# Patient Record
Sex: MALE | Race: WHITE | Employment: OTHER | URBAN - METROPOLITAN AREA
[De-identification: names, ages, dates, MRNs, and addresses within clinical notes are randomized per-mention and may not be internally consistent; named-entity substitution may affect disease eponyms.]

---

## 2017-12-08 ENCOUNTER — HOSPITAL ENCOUNTER (OUTPATIENT)
Dept: MRI IMAGING | Age: 72
Discharge: HOME | End: 2017-12-08
Attending: ORTHOPAEDIC SURGERY
Payer: MEDICARE

## 2017-12-08 DIAGNOSIS — M51.36 DEGENERATION OF LUMBAR INTERVERTEBRAL DISC: ICD-10-CM

## 2017-12-08 DIAGNOSIS — M54.40 LOW BACK PAIN WITH SCIATICA: ICD-10-CM

## 2017-12-08 DIAGNOSIS — M54.16 LEFT LUMBAR RADICULITIS: ICD-10-CM

## 2017-12-08 DIAGNOSIS — M43.16 SPONDYLOLISTHESIS OF LUMBAR REGION: ICD-10-CM

## 2017-12-08 PROCEDURE — 72148 MRI LUMBAR SPINE W/O DYE: CPT

## 2018-04-13 ENCOUNTER — HOSPITAL ENCOUNTER (OUTPATIENT)
Dept: MRI IMAGING | Age: 73
Discharge: HOME | End: 2018-04-13
Attending: ORTHOPAEDIC SURGERY
Payer: MEDICARE

## 2018-04-13 DIAGNOSIS — M54.50 LUMBAGO: ICD-10-CM

## 2018-04-13 DIAGNOSIS — M43.16 SPONDYLOLISTHESIS OF LUMBAR REGION: ICD-10-CM

## 2018-04-13 DIAGNOSIS — M51.26 LUMBAR DISC HERNIATION: ICD-10-CM

## 2018-04-13 DIAGNOSIS — M54.16 LUMBAR RADICULOPATHY: ICD-10-CM

## 2018-04-13 DIAGNOSIS — M48.061 SPINAL STENOSIS, LUMBAR REGION, WITHOUT NEUROGENIC CLAUDICATION: ICD-10-CM

## 2018-04-13 PROCEDURE — 72148 MRI LUMBAR SPINE W/O DYE: CPT

## 2018-04-20 ENCOUNTER — HOSPITAL ENCOUNTER (OUTPATIENT)
Dept: PREADMISSION TESTING | Age: 73
Discharge: HOME | End: 2018-04-20
Payer: MEDICARE

## 2018-04-20 VITALS
WEIGHT: 263.5 LBS | HEIGHT: 68 IN | TEMPERATURE: 97.8 F | SYSTOLIC BLOOD PRESSURE: 107 MMHG | RESPIRATION RATE: 20 BRPM | OXYGEN SATURATION: 95 % | DIASTOLIC BLOOD PRESSURE: 59 MMHG | BODY MASS INDEX: 39.93 KG/M2 | HEART RATE: 65 BPM

## 2018-04-20 LAB
ALBUMIN SERPL-MCNC: 3.7 G/DL (ref 3.5–5)
ALBUMIN/GLOB SERPL: 0.9 {RATIO} (ref 1.1–2.2)
ALP SERPL-CCNC: 76 U/L (ref 45–117)
ALT SERPL-CCNC: 37 U/L (ref 12–78)
ANION GAP SERPL CALC-SCNC: 11 MMOL/L (ref 5–15)
APTT PPP: 27.9 SEC (ref 22.1–32)
AST SERPL-CCNC: 28 U/L (ref 15–37)
BASOPHILS # BLD: 0 K/UL (ref 0–0.1)
BASOPHILS NFR BLD: 1 % (ref 0–1)
BILIRUB SERPL-MCNC: 0.6 MG/DL (ref 0.2–1)
BUN SERPL-MCNC: 84 MG/DL (ref 6–20)
BUN/CREAT SERPL: 15 (ref 12–20)
CALCIUM SERPL-MCNC: 9 MG/DL (ref 8.5–10.1)
CHLORIDE SERPL-SCNC: 97 MMOL/L (ref 97–108)
CO2 SERPL-SCNC: 25 MMOL/L (ref 21–32)
CREAT SERPL-MCNC: 5.73 MG/DL (ref 0.7–1.3)
DIFFERENTIAL METHOD BLD: ABNORMAL
EOSINOPHIL # BLD: 0.2 K/UL (ref 0–0.4)
EOSINOPHIL NFR BLD: 4 % (ref 0–7)
ERYTHROCYTE [DISTWIDTH] IN BLOOD BY AUTOMATED COUNT: 15.6 % (ref 11.5–14.5)
EST. AVERAGE GLUCOSE BLD GHB EST-MCNC: 160 MG/DL
GLOBULIN SER CALC-MCNC: 4 G/DL (ref 2–4)
GLUCOSE SERPL-MCNC: 207 MG/DL (ref 65–100)
HBA1C MFR BLD: 7.2 % (ref 4.2–6.3)
HCT VFR BLD AUTO: 34.6 % (ref 36.6–50.3)
HGB BLD-MCNC: 10.4 G/DL (ref 12.1–17)
IMM GRANULOCYTES # BLD: 0 K/UL (ref 0–0.04)
IMM GRANULOCYTES NFR BLD AUTO: 1 % (ref 0–0.5)
INR PPP: 1.1 (ref 0.9–1.1)
LYMPHOCYTES # BLD: 0.9 K/UL (ref 0.8–3.5)
LYMPHOCYTES NFR BLD: 14 % (ref 12–49)
MCH RBC QN AUTO: 29.8 PG (ref 26–34)
MCHC RBC AUTO-ENTMCNC: 30.1 G/DL (ref 30–36.5)
MCV RBC AUTO: 99.1 FL (ref 80–99)
MONOCYTES # BLD: 0.5 K/UL (ref 0–1)
MONOCYTES NFR BLD: 7 % (ref 5–13)
NEUTS SEG # BLD: 4.6 K/UL (ref 1.8–8)
NEUTS SEG NFR BLD: 74 % (ref 32–75)
NRBC # BLD: 0 K/UL (ref 0–0.01)
NRBC BLD-RTO: 0 PER 100 WBC
PLATELET # BLD AUTO: 137 K/UL (ref 150–400)
PMV BLD AUTO: 12.2 FL (ref 8.9–12.9)
POTASSIUM SERPL-SCNC: 6 MMOL/L (ref 3.5–5.1)
PROT SERPL-MCNC: 7.7 G/DL (ref 6.4–8.2)
PROTHROMBIN TIME: 11.6 SEC (ref 9–11.1)
RBC # BLD AUTO: 3.49 M/UL (ref 4.1–5.7)
SODIUM SERPL-SCNC: 133 MMOL/L (ref 136–145)
THERAPEUTIC RANGE,PTTT: NORMAL SECS (ref 58–77)
WBC # BLD AUTO: 6.3 K/UL (ref 4.1–11.1)

## 2018-04-20 PROCEDURE — 85025 COMPLETE CBC W/AUTO DIFF WBC: CPT | Performed by: ORTHOPAEDIC SURGERY

## 2018-04-20 PROCEDURE — 85730 THROMBOPLASTIN TIME PARTIAL: CPT | Performed by: ORTHOPAEDIC SURGERY

## 2018-04-20 PROCEDURE — 93005 ELECTROCARDIOGRAM TRACING: CPT

## 2018-04-20 PROCEDURE — 80053 COMPREHEN METABOLIC PANEL: CPT | Performed by: ORTHOPAEDIC SURGERY

## 2018-04-20 PROCEDURE — 85610 PROTHROMBIN TIME: CPT | Performed by: ORTHOPAEDIC SURGERY

## 2018-04-20 PROCEDURE — 36415 COLL VENOUS BLD VENIPUNCTURE: CPT | Performed by: ORTHOPAEDIC SURGERY

## 2018-04-20 PROCEDURE — 83036 HEMOGLOBIN GLYCOSYLATED A1C: CPT | Performed by: ORTHOPAEDIC SURGERY

## 2018-04-20 PROCEDURE — 82306 VITAMIN D 25 HYDROXY: CPT | Performed by: ORTHOPAEDIC SURGERY

## 2018-04-20 PROCEDURE — 86900 BLOOD TYPING SEROLOGIC ABO: CPT | Performed by: ORTHOPAEDIC SURGERY

## 2018-04-20 RX ORDER — BUMETANIDE 2 MG/1
1 TABLET ORAL DAILY
COMMUNITY

## 2018-04-20 RX ORDER — LISINOPRIL 20 MG/1
20 TABLET ORAL DAILY
COMMUNITY
End: 2021-04-06

## 2018-04-20 RX ORDER — ASPIRIN 81 MG/1
81 TABLET ORAL DAILY
COMMUNITY
End: 2021-04-06

## 2018-04-20 RX ORDER — GABAPENTIN 300 MG/1
300 CAPSULE ORAL 3 TIMES DAILY
COMMUNITY
End: 2021-04-06

## 2018-04-20 RX ORDER — CARVEDILOL 12.5 MG/1
TABLET ORAL 2 TIMES DAILY WITH MEALS
COMMUNITY

## 2018-04-20 NOTE — PERIOP NOTES
Discussed case with Dr. Garry Calles (anesthesia)- recent hospitalization for diastolic heart failure and progressive renal insufficiency. Patient should take Bumex the morning of surgery per anesthesia- notified wife Warren Hendrix) of these instructions.

## 2018-04-20 NOTE — H&P
PAT Pre-Op History & Physical    Patient: Lyndall Brunner                  MRN: [id number]          SSN: [id number]  YOB: 1945          Age: 67 y.o. Sex: male                Subjective:   Patient is a 67 y.o.  male who presents with history of chronic lower back pain. He began having back pain 11/2017 but it resolved after ZANDRA. In March his lower back pain has escalated over time. Rates his lower back pain as high as 10/10 and describes it as a constant aching pain. States that his pain radiates from his lower back into his left hip and down his left leg to below his knee. States when he stands up he has a sensation of \"bugs crawling up my left leg\". Walking and standing exacerbate his pain, lying down helps lessen his pain. His activity level has been greatly impacted due to his back pain and he is very inactive. Lumbar MRI done 04/13/2018 showed:    1. There is extruded disc herniation on the left arising at L2-3 and extending inferiorly along L5 the posterior margin of the left of midline. The extruded fragment is slightly larger than on the previous examination there is compression of the thecal sac. 2. There is moderate central spinal stenosis at L3-4 which is worse than on the previous exam.  3. There is severe central spinal stenosis at L4-5 which is worse than on the previous exam.     Has failed NSAIDs, narcotic pain medication, and  Gabapentin. The patient was evaluated in the surgeon's office and it was determined that the most appropriate plan of care is to proceed with surgical intervention. Patient's PCP Meli Torres MD      Patient's wife Austin Nelson helps provide history. She states that the patient was hospitalized at [facility] 04/02/2018 - 17/34/7149 with diastolic heart failure and progressive renal insufficiency.  She says he developed worsening dyspnea and had a rapid weight gain that sent him to the ED and he was then transferred to [address] [place] [address]. He was diuresed of 30 pounds of fluid and was seen by cardiology and nephrology while hospitalized. Has not had any chest pain or shortness of breath at rest since he was released from hospital.     Patient has NIDDM and has been off his Metformin 1000 mg 2x/day since he was admitted to the hospital. While at [address] [place] [address] his wife states he was on sliding scale insulin. His PCP was reluctant to start any new therapy until renal issues are clarified so patient is only on Glipizide 10 mg/day at this time. His wife states that his FSBS have been running higher than usual since he was dsichargeg from the hospital and only on Glipizide. Past Medical History:   Diagnosis Date    Arthritis     Cancer ([facility]     pre-cancerous skin    Chronic kidney disease     CKD stage 3- nephrologist Dr. Reynaldo Mendez    Chronic pain     Diabetes ([facility]     NIDDM    Diverticulosis     GERD (gastroesophageal reflux disease)     Heart failure ([facility] 83/77/9174    diastolic heart failure- hospitalized 4/02/2018- 4/08/2018 at [address] [place] [address]    History of colonic polyps 2006    Hypercholesterolemia     Hypertension     Ill-defined condition     burn to back from tractor    Inguinal hernia     Right.  Kidney stones 2003    Morbid obesity ([facility]     Other ill-defined conditions(799.89)     CHRONIC CYST LOWER BACK    Sleep apnea     Told he has sleep apnea when hospitalized-Needs to have sleep study done      Past Surgical History:   Procedure Laterality Date    ABDOMEN SURGERY PROC UNLISTED  11/2017    hernia surgery midline    HX HEMORRHOIDECTOMY  6/13/13    HX ORTHOPAEDIC      Right carpal tunnel release. Prior to Admission medications    Medication Sig Start Date End Date Taking? Authorizing Provider   lisinopril (PRINIVIL, ZESTRIL) 20 mg tablet Take 20 mg by mouth daily. Yes Historical Provider   aspirin delayed-release 81 mg tablet Take 81 mg by mouth daily.  Has not started as yet   Yes Historical Provider   gabapentin (NEURONTIN) 300 mg capsule Take 300 mg by mouth three (3) times daily. Takes 2 in am one in middle of day and 2 at evening   Yes Historical Provider   FOLIC ACID/MULTIVIT-MIN/LUTEIN (CENTRUM SILVER PO) Take  by mouth daily. Has not started yet, will begin after his surgery   Yes Historical Provider   carvedilol (COREG) 12.5 mg tablet Take  by mouth two (2) times daily (with meals). Yes Historical Provider   bumetanide (BUMEX) 2 mg tablet Take 2 mg by mouth daily. Yes Historical Provider   oxyCODONE IR (ROXICODONE) 5 mg immediate release tablet Take 1-2 Tabs by mouth every four (4) hours as needed for Pain. 6/27/13  Yes Eri Harmon MD   omeprazole (PRILOSEC) 20 mg capsule Take 20 mg by mouth daily. Yes Historical Provider   glipiZIDE (GLUCOTROL) 10 mg tablet Take 10 mg by mouth daily. XL    Yes Historical Provider   simvastatin (ZOCOR) 40 mg tablet Take 40 mg by mouth daily. Yes Historical Provider   vitamin e 400 unit tab Take  by mouth. Historical Provider     Current Outpatient Prescriptions   Medication Sig    lisinopril (PRINIVIL, ZESTRIL) 20 mg tablet Take 20 mg by mouth daily.  aspirin delayed-release 81 mg tablet Take 81 mg by mouth daily. Has not started as yet    gabapentin (NEURONTIN) 300 mg capsule Take 300 mg by mouth three (3) times daily. Takes 2 in am one in middle of day and 2 at evening    FOLIC ACID/MULTIVIT-MIN/LUTEIN (CENTRUM SILVER PO) Take  by mouth daily. Has not started yet, will begin after his surgery    carvedilol (COREG) 12.5 mg tablet Take  by mouth two (2) times daily (with meals).  bumetanide (BUMEX) 2 mg tablet Take 2 mg by mouth daily.  oxyCODONE IR (ROXICODONE) 5 mg immediate release tablet Take 1-2 Tabs by mouth every four (4) hours as needed for Pain.  omeprazole (PRILOSEC) 20 mg capsule Take 20 mg by mouth daily.  glipiZIDE (GLUCOTROL) 10 mg tablet Take 10 mg by mouth daily.  XL     simvastatin (ZOCOR) 40 mg tablet Take 40 mg by mouth daily.      vitamin e 400 unit tab Take  by mouth. No current facility-administered medications for this encounter. No Known Allergies   Social History   Substance Use Topics    Smoking status: Former Smoker     Packs/day: 2.00     Years: 40.00     Quit date: 1995    Smokeless tobacco: Current User      Comment: quit smoking cigarettes 25 yrs ago, CHEWS TOB.  Alcohol use No      History   Drug Use No     Family History   Problem Relation Age of Onset    Heart Attack Mother     Other Mother      encephalitis    Heart Disease Mother      MI    Cancer Father      lung    Diabetes Sister     COPD Sister     Dementia Sister     Arthritis-osteo Sister     Parkinson's Disease Brother     Arthritis-osteo Sister          Review of Systems    Patient denies difficulty swallowing, mouth sores, or loose teeth. Patient denies any recent dental procedures or any planned prior to surgery. Patient denies chest pain, tightness, pain radiating down left arm, palpitations. Denies dizziness, visual disturbances, or lightheadedness. Patient denies shortness of breath at rest, wheezing, cough, fever, or chills. Patient denies diarrhea, constipation, or abdominal pain. Patient denies urinary problems including dysuria, hesitancy, urgency, or incontinence. Denies skin breakdown, rashes, insect bites or open area. C/o lower back pain. Objective:   Patient Vitals for the past 24 hrs:   Temp Pulse Resp BP SpO2   18 1348 97.8 °F (36.6 °C) 65 20 107/59 95 %     Temp (24hrs), Av.8 °F (36.6 °C), Min:97.8 °F (36.6 °C), Max:97.8 °F (36.6 °C)    Body mass index is 40.07 kg/(m^2). Wt Readings from Last 1 Encounters:   18 119.5 kg (263 lb 8 oz)        Physical Exam:     General: Pleasant,  cooperative, no apparent distress, appears stated age. Eyes: Conjunctivae/corneas clear. EOMs intact.    Nose: Nares normal.   Mouth/Throat: Lips, mucosa, and tongue normal. Upper and lower dentures in place.   Neck: Supple, symmetrical, trachea midline. Back: Symmetric   Lungs: Clear to auscultation bilaterally. Heart: Regular rate and rhythm. Heart sounds distant-S1, S2 normal. No murmur, click, rub or gallop. Abdomen: Semi-soft,rotund, non-tender. Bowel sounds normal. No distention. Musculoskeletal:  Gait is antalgic. Uses cane to ambulate. Extremities:  Extremities normal, atraumatic, no cyanosis or edema. Calves                                 supple, non tender to palpation. Pulses: 2+ and symmetric bilateral upper extremities. Cap. refill <2 seconds   Skin: Skin color, texture, turgor normal.  No rashes or lesions. Neurologic: CN II-XII grossly intact. Alert and oriented x3. Labs:   Recent Results (from the past 72 hour(s))   CBC WITH AUTOMATED DIFF    Collection Time: 04/20/18  2:59 PM   Result Value Ref Range    WBC 6.3 4.1 - 11.1 K/uL    RBC 3.49 (L) 4.10 - 5.70 M/uL    HGB 10.4 (L) 12.1 - 17.0 g/dL    HCT 34.6 (L) 36.6 - 50.3 %    MCV 99.1 (H) 80.0 - 99.0 FL    MCH 29.8 26.0 - 34.0 PG    MCHC 30.1 30.0 - 36.5 g/dL    RDW 15.6 (H) 11.5 - 14.5 %    PLATELET 822 (L) 154 - 400 K/uL    MPV 12.2 8.9 - 12.9 FL    NRBC 0.0 0  WBC    ABSOLUTE NRBC 0.00 0.00 - 0.01 K/uL    NEUTROPHILS 74 32 - 75 %    LYMPHOCYTES 14 12 - 49 %    MONOCYTES 7 5 - 13 %    EOSINOPHILS 4 0 - 7 %    BASOPHILS 1 0 - 1 %    IMMATURE GRANULOCYTES 1 (H) 0.0 - 0.5 %    ABS. NEUTROPHILS 4.6 1.8 - 8.0 K/UL    ABS. LYMPHOCYTES 0.9 0.8 - 3.5 K/UL    ABS. MONOCYTES 0.5 0.0 - 1.0 K/UL    ABS. EOSINOPHILS 0.2 0.0 - 0.4 K/UL    ABS. BASOPHILS 0.0 0.0 - 0.1 K/UL    ABS. IMM.  GRANS. 0.0 0.00 - 0.04 K/UL    DF AUTOMATED     METABOLIC PANEL, COMPREHENSIVE    Collection Time: 04/20/18  2:59 PM   Result Value Ref Range    Sodium 133 (L) 136 - 145 mmol/L    Potassium 6.0 (H) 3.5 - 5.1 mmol/L    Chloride 97 97 - 108 mmol/L    CO2 25 21 - 32 mmol/L    Anion gap 11 5 - 15 mmol/L    Glucose 207 (H) 65 - 100 mg/dL    BUN 84 (H) 6 - 20 MG/DL    Creatinine 5.73 (H) 0.70 - 1.30 MG/DL    BUN/Creatinine ratio 15 12 - 20      GFR est AA 12 (L) >60 ml/min/1.73m2    GFR est non-AA 10 (L) >60 ml/min/1.73m2    Calcium 9.0 8.5 - 10.1 MG/DL    Bilirubin, total 0.6 0.2 - 1.0 MG/DL    ALT (SGPT) 37 12 - 78 U/L    AST (SGOT) 28 15 - 37 U/L    Alk. phosphatase 76 45 - 117 U/L    Protein, total 7.7 6.4 - 8.2 g/dL    Albumin 3.7 3.5 - 5.0 g/dL    Globulin 4.0 2.0 - 4.0 g/dL    A-G Ratio 0.9 (L) 1.1 - 2.2     HEMOGLOBIN A1C WITH EAG    Collection Time: 04/20/18  2:59 PM   Result Value Ref Range    Hemoglobin A1c 7.2 (H) 4.2 - 6.3 %    Est. average glucose 160 mg/dL   CULTURE, MRSA    Collection Time: 04/20/18  2:59 PM   Result Value Ref Range    Special Requests: NO SPECIAL REQUESTS      Culture result: MRSA NOT PRESENT      Culture result:            Screening of patient nares for MRSA is for surveillance purposes and, if positive, to facilitate isolation considerations in high risk settings. It is not intended for automatic decolonization interventions per se as regimens are not sufficiently effective to warrant routine use.    PROTHROMBIN TIME + INR    Collection Time: 04/20/18  2:59 PM   Result Value Ref Range    INR 1.1 0.9 - 1.1      Prothrombin time 11.6 (H) 9.0 - 11.1 sec   PTT    Collection Time: 04/20/18  2:59 PM   Result Value Ref Range    aPTT 27.9 22.1 - 32.0 sec    aPTT, therapeutic range     58.0 - 77.0 SECS   VITAMIN D, 25 HYDROXY    Collection Time: 04/20/18  2:59 PM   Result Value Ref Range    Vitamin D 25-Hydroxy 36.8 30 - 100 ng/mL   TYPE & SCREEN    Collection Time: 04/20/18  2:59 PM   Result Value Ref Range    Crossmatch Expiration 04/27/2018     ABO/Rh(D) A POSITIVE     Antibody screen NEG    EKG, 12 LEAD, INITIAL    Collection Time: 04/20/18  4:07 PM   Result Value Ref Range    Ventricular Rate 59 BPM    Atrial Rate 59 BPM    P-R Interval 222 ms    QRS Duration 94 ms    Q-T Interval 422 ms    QTC Calculation (Bezet) 417 ms    Calculated P Axis 61 degrees    Calculated R Axis -22 degrees    Calculated T Axis 31 degrees    Diagnosis       Sinus bradycardia with 1st degree AV block  Otherwise normal ECG  When compared with ECG of 21-JUN-2013 12:52,  UT interval has increased  Confirmed by Ariana Navarro MD, [address] ([id number]) on 4/21/2018 6:23:41 PM         Assessment:     Spinal stenosis of lumbar region with radiculopathy  Spondylolisthesis of lumbar region  Left lumbar radiculitis  Herniation of lumbar intervertebral disc  Disc degeneration, lumbar. Diabetes control less than optimal due to medication changes/ renal issues. Plan:     Scheduled for L2- L3 laminectomy and discectomy. Labs and EKG done per surgeon's orders. EKG unremarkable exept for 1st degree AV block. Lab results reviewed and multiple abnormal results on chemistry noted- creatinine =5.73/GFR=10, potassium= 6.0. Much worse that results from PCP on 4/11/2018 (creatinine= 2.07, GFR=31, potassium=4.0). CMP and CBC results faxed to PCP (Dr. Marko Choi) and nephrology (Dr. Shirley Mckeon). H/H slightly low ( 10.4/34.6) and platelets slightly decreased (137). Also PT slightly elevated  (11.6) but INR and PTT normal. Abnormal labs sent to surgeon's office via EMR. Copy of cardiac clearance from [address] and copy of renal clearance from Dr. Shirley Mckeon reviewed and both notes placed on paper chart. Labs obtained at PAT visit indicate that renal status has deteriorated since PCP labs- must be evaluated prior to surgery per anesthesia. Diabetes management may need to be adjusted after surgery to optimize healing. Patient unable to void at PAT on 4/20 - wife to bring urine sample to PAT 4/23.         Liset Espinoza, WILLIE

## 2018-04-21 LAB
25(OH)D3 SERPL-MCNC: 36.8 NG/ML (ref 30–100)
ABO + RH BLD: NORMAL
ATRIAL RATE: 59 BPM
BLOOD GROUP ANTIBODIES SERPL: NORMAL
CALCULATED P AXIS, ECG09: 61 DEGREES
CALCULATED R AXIS, ECG10: -22 DEGREES
CALCULATED T AXIS, ECG11: 31 DEGREES
DIAGNOSIS, 93000: NORMAL
P-R INTERVAL, ECG05: 222 MS
Q-T INTERVAL, ECG07: 422 MS
QRS DURATION, ECG06: 94 MS
QTC CALCULATION (BEZET), ECG08: 417 MS
SPECIMEN EXP DATE BLD: NORMAL
VENTRICULAR RATE, ECG03: 59 BPM

## 2018-04-22 LAB
BACTERIA SPEC CULT: NORMAL
BACTERIA SPEC CULT: NORMAL
SERVICE CMNT-IMP: NORMAL

## 2018-04-23 NOTE — PROGRESS NOTES
Renal function much worse than labs done 4/11 by PCP. Faxed lab results to PCP and nephrology office. Called and left message with Dr. Tonio Ta assistant that he needs to see labs ASAP. Discussed case with anesthesia (Dr. Shanti Montgomery) and he said surgery has to be postponed until renal situation addressed. Labs sent to Dr. Jackie Dixon via EMR, LM for office  re: abnormal labs. Also sent text re: abnormal labs and anesthesia says surgery must be postponed. Called and spoke with Sreekanth Jacobson in Dr Kirk Wiggins office ( nephrology)- she states they received copy of PAT labs. Asked that Dr. Chacon Swathi see labs ASAP. Spoke with patient's wife- Lisa Howe. Informed her that labs indicate that Mr. Traylor's kidney function has worsened since labs last drawn by PCP. Notified her that surgery cannot proceed per anesthesia until changes in labs addressed and patient needs to be seen by nephrology or PCP today. Patient called back later to let me know that they were in ED at [facility] per Dr. Kirk Wiggins instructions. Dr. Silvino Boss notified .

## 2020-01-07 ENCOUNTER — HOSPITAL ENCOUNTER (OUTPATIENT)
Dept: LAB | Age: 75
Discharge: HOME | End: 2020-01-07

## 2021-03-23 ENCOUNTER — TRANSCRIBE ORDER (OUTPATIENT)
Dept: SCHEDULING | Age: 76
End: 2021-03-23

## 2021-03-23 DIAGNOSIS — M19.072 ARTHRITIS OF LEFT ANKLE: Primary | ICD-10-CM

## 2021-03-30 ENCOUNTER — HOSPITAL ENCOUNTER (OUTPATIENT)
Dept: MRI IMAGING | Age: 76
Discharge: HOME | End: 2021-03-30
Attending: ORTHOPAEDIC SURGERY
Payer: MEDICARE

## 2021-03-30 DIAGNOSIS — M19.072 ARTHRITIS OF LEFT ANKLE: ICD-10-CM

## 2021-03-30 PROCEDURE — 73721 MRI JNT OF LWR EXTRE W/O DYE: CPT

## 2021-04-01 ENCOUNTER — TRANSCRIBE ORDER (OUTPATIENT)
Dept: REGISTRATION | Age: 76
End: 2021-04-01

## 2021-04-01 DIAGNOSIS — Z01.812 PRE-PROCEDURE LAB EXAM: Primary | ICD-10-CM

## 2021-04-03 ENCOUNTER — HOSPITAL ENCOUNTER (OUTPATIENT)
Dept: PREADMISSION TESTING | Age: 76
Discharge: HOME | End: 2021-04-03
Payer: MEDICARE

## 2021-04-03 DIAGNOSIS — Z01.812 PRE-PROCEDURE LAB EXAM: ICD-10-CM

## 2021-04-03 PROCEDURE — U0003 INFECTIOUS AGENT DETECTION BY NUCLEIC ACID (DNA OR RNA); SEVERE ACUTE RESPIRATORY SYNDROME CORONAVIRUS 2 (SARS-COV-2) (CORONAVIRUS DISEASE [COVID-19]), AMPLIFIED PROBE TECHNIQUE, MAKING USE OF HIGH THROUGHPUT TECHNOLOGIES AS DESCRIBED BY CMS-2020-01-R: HCPCS

## 2021-04-04 LAB — SARS-COV-2, COV2NT: NOT DETECTED

## 2021-04-06 ENCOUNTER — ANESTHESIA EVENT (OUTPATIENT)
Dept: SURGERY | Age: 76
End: 2021-04-06
Payer: MEDICARE

## 2021-04-06 RX ORDER — TRAZODONE HYDROCHLORIDE 50 MG/1
50 TABLET ORAL
COMMUNITY

## 2021-04-06 RX ORDER — METFORMIN HYDROCHLORIDE 1000 MG/1
1000 TABLET ORAL 2 TIMES DAILY WITH MEALS
COMMUNITY

## 2021-04-06 RX ORDER — CLONIDINE HYDROCHLORIDE 0.2 MG/1
0.2 TABLET ORAL 2 TIMES DAILY
COMMUNITY

## 2021-04-06 RX ORDER — CHOLECALCIFEROL (VITAMIN D3) 125 MCG
10 CAPSULE ORAL
COMMUNITY

## 2021-04-06 RX ORDER — AMLODIPINE BESYLATE 5 MG/1
5 TABLET ORAL DAILY
COMMUNITY

## 2021-04-06 RX ORDER — ACETAMINOPHEN 325 MG/1
650 TABLET ORAL 2 TIMES DAILY
COMMUNITY

## 2021-04-06 NOTE — PERIOP NOTES
ELOY GUSMAN COMPLETED, SPOKE WITH PATIENT AND HIS WIFE TODAY. PATIENT SEES DR. Mckenzie Damian (CARDIO) [phone], CALLED FOR LAST OFFICE VISIT NOTE AND EKG. PATIENT AND HIS WIFE GIVEN INSTRUCTIONS REGARDING USING ANTIBACTERIAL SOAP AND BATHING AT HOME WITH THAT THE NIGHT BEFORE SURGERY AND MORNING OF AS THEY SAID IT WOULD BE DIFFICULT FOR THEM TO GET CHG SOAP AT THIS TIME. PATIENT AND HIS WIFE MENTIONED CYST TO HIS WAISTLINE THAT BROKE OPEN, ADVISED THEM TO CALL AND LET DR. WESTON KASPER'S OFFICE KNOW OF OPEN SKIN AT THIS TIME WITH SURGERY TOMORROW 4/7/21 . PATIENT SEES DR. ESCUDERO (NEPHRO) FOR CKD3- CALLED FOR LAST OFFICE NOTE.  [phone]

## 2021-04-07 ENCOUNTER — HOSPITAL ENCOUNTER (OUTPATIENT)
Age: 76
Setting detail: OUTPATIENT SURGERY
Discharge: HOME | End: 2021-04-07
Attending: ORTHOPAEDIC SURGERY | Admitting: ORTHOPAEDIC SURGERY
Payer: MEDICARE

## 2021-04-07 ENCOUNTER — ANESTHESIA (OUTPATIENT)
Dept: SURGERY | Age: 76
End: 2021-04-07
Payer: MEDICARE

## 2021-04-07 VITALS
OXYGEN SATURATION: 96 % | RESPIRATION RATE: 16 BRPM | TEMPERATURE: 98.1 F | HEART RATE: 50 BPM | HEIGHT: 68 IN | BODY MASS INDEX: 40.32 KG/M2 | DIASTOLIC BLOOD PRESSURE: 83 MMHG | SYSTOLIC BLOOD PRESSURE: 150 MMHG | WEIGHT: 266 LBS

## 2021-04-07 DIAGNOSIS — M67.472 GANGLION OF LEFT ANKLE: Primary | ICD-10-CM

## 2021-04-07 LAB
EST. AVERAGE GLUCOSE BLD GHB EST-MCNC: 183 MG/DL
GLUCOSE BLD STRIP.AUTO-MCNC: 154 MG/DL (ref 65–100)
GLUCOSE BLD STRIP.AUTO-MCNC: 189 MG/DL (ref 65–100)
HBA1C MFR BLD: 8 % (ref 4–5.6)
SERVICE CMNT-IMP: ABNORMAL
SERVICE CMNT-IMP: ABNORMAL

## 2021-04-07 PROCEDURE — 77030002933 HC SUT MCRYL J&J -A: Performed by: ORTHOPAEDIC SURGERY

## 2021-04-07 PROCEDURE — 74011250636 HC RX REV CODE- 250/636: Performed by: ANESTHESIOLOGY

## 2021-04-07 PROCEDURE — 88304 TISSUE EXAM BY PATHOLOGIST: CPT

## 2021-04-07 PROCEDURE — 77030002916 HC SUT ETHLN J&J -A: Performed by: ORTHOPAEDIC SURGERY

## 2021-04-07 PROCEDURE — 36415 COLL VENOUS BLD VENIPUNCTURE: CPT

## 2021-04-07 PROCEDURE — 2709999900 HC NON-CHARGEABLE SUPPLY: Performed by: ORTHOPAEDIC SURGERY

## 2021-04-07 PROCEDURE — 74011250637 HC RX REV CODE- 250/637: Performed by: ANESTHESIOLOGY

## 2021-04-07 PROCEDURE — 76210000020 HC REC RM PH II FIRST 0.5 HR: Performed by: ORTHOPAEDIC SURGERY

## 2021-04-07 PROCEDURE — 76060000033 HC ANESTHESIA 1 TO 1.5 HR: Performed by: ORTHOPAEDIC SURGERY

## 2021-04-07 PROCEDURE — 76210000006 HC OR PH I REC 0.5 TO 1 HR: Performed by: ORTHOPAEDIC SURGERY

## 2021-04-07 PROCEDURE — 76010000138 HC OR TIME 0.5 TO 1 HR: Performed by: ORTHOPAEDIC SURGERY

## 2021-04-07 PROCEDURE — 83036 HEMOGLOBIN GLYCOSYLATED A1C: CPT

## 2021-04-07 PROCEDURE — 74011250636 HC RX REV CODE- 250/636: Performed by: ORTHOPAEDIC SURGERY

## 2021-04-07 PROCEDURE — 74011250636 HC RX REV CODE- 250/636: Performed by: NURSE ANESTHETIST, CERTIFIED REGISTERED

## 2021-04-07 PROCEDURE — 82962 GLUCOSE BLOOD TEST: CPT

## 2021-04-07 PROCEDURE — 88311 DECALCIFY TISSUE: CPT

## 2021-04-07 PROCEDURE — 74011000250 HC RX REV CODE- 250: Performed by: NURSE ANESTHETIST, CERTIFIED REGISTERED

## 2021-04-07 RX ORDER — MIDAZOLAM HYDROCHLORIDE 1 MG/ML
1 INJECTION, SOLUTION INTRAMUSCULAR; INTRAVENOUS AS NEEDED
Status: DISCONTINUED | OUTPATIENT
Start: 2021-04-07 | End: 2021-04-07 | Stop reason: HOSPADM

## 2021-04-07 RX ORDER — PROPOFOL 10 MG/ML
INJECTION, EMULSION INTRAVENOUS
Status: DISCONTINUED | OUTPATIENT
Start: 2021-04-07 | End: 2021-04-07 | Stop reason: HOSPADM

## 2021-04-07 RX ORDER — SODIUM CHLORIDE 0.9 % (FLUSH) 0.9 %
5-40 SYRINGE (ML) INJECTION EVERY 8 HOURS
Status: DISCONTINUED | OUTPATIENT
Start: 2021-04-07 | End: 2021-04-07 | Stop reason: HOSPADM

## 2021-04-07 RX ORDER — HYDROMORPHONE HYDROCHLORIDE 1 MG/ML
0.2 INJECTION, SOLUTION INTRAMUSCULAR; INTRAVENOUS; SUBCUTANEOUS
Status: DISCONTINUED | OUTPATIENT
Start: 2021-04-07 | End: 2021-04-07 | Stop reason: HOSPADM

## 2021-04-07 RX ORDER — SODIUM CHLORIDE 0.9 % (FLUSH) 0.9 %
5-40 SYRINGE (ML) INJECTION AS NEEDED
Status: DISCONTINUED | OUTPATIENT
Start: 2021-04-07 | End: 2021-04-07 | Stop reason: HOSPADM

## 2021-04-07 RX ORDER — SODIUM CHLORIDE 9 MG/ML
50 INJECTION, SOLUTION INTRAVENOUS CONTINUOUS
Status: DISCONTINUED | OUTPATIENT
Start: 2021-04-07 | End: 2021-04-07 | Stop reason: HOSPADM

## 2021-04-07 RX ORDER — OXYCODONE AND ACETAMINOPHEN 5; 325 MG/1; MG/1
1 TABLET ORAL AS NEEDED
Status: DISCONTINUED | OUTPATIENT
Start: 2021-04-07 | End: 2021-04-07 | Stop reason: HOSPADM

## 2021-04-07 RX ORDER — EPHEDRINE SULFATE/0.9% NACL/PF 50 MG/5 ML
5 SYRINGE (ML) INTRAVENOUS AS NEEDED
Status: DISCONTINUED | OUTPATIENT
Start: 2021-04-07 | End: 2021-04-07 | Stop reason: HOSPADM

## 2021-04-07 RX ORDER — SODIUM CHLORIDE 9 MG/ML
1000 INJECTION, SOLUTION INTRAVENOUS CONTINUOUS
Status: DISCONTINUED | OUTPATIENT
Start: 2021-04-07 | End: 2021-04-07 | Stop reason: HOSPADM

## 2021-04-07 RX ORDER — ACETAMINOPHEN 325 MG/1
650 TABLET ORAL ONCE
Status: COMPLETED | OUTPATIENT
Start: 2021-04-07 | End: 2021-04-07

## 2021-04-07 RX ORDER — FENTANYL CITRATE 50 UG/ML
50 INJECTION, SOLUTION INTRAMUSCULAR; INTRAVENOUS AS NEEDED
Status: DISCONTINUED | OUTPATIENT
Start: 2021-04-07 | End: 2021-04-07 | Stop reason: HOSPADM

## 2021-04-07 RX ORDER — CEPHALEXIN 500 MG/1
500 CAPSULE ORAL 4 TIMES DAILY
Qty: 4 CAP | Refills: 0 | Status: SHIPPED | OUTPATIENT
Start: 2021-04-07 | End: 2021-04-08

## 2021-04-07 RX ORDER — SODIUM CHLORIDE, SODIUM LACTATE, POTASSIUM CHLORIDE, CALCIUM CHLORIDE 600; 310; 30; 20 MG/100ML; MG/100ML; MG/100ML; MG/100ML
125 INJECTION, SOLUTION INTRAVENOUS CONTINUOUS
Status: DISCONTINUED | OUTPATIENT
Start: 2021-04-07 | End: 2021-04-07 | Stop reason: HOSPADM

## 2021-04-07 RX ORDER — ONDANSETRON 2 MG/ML
4 INJECTION INTRAMUSCULAR; INTRAVENOUS AS NEEDED
Status: DISCONTINUED | OUTPATIENT
Start: 2021-04-07 | End: 2021-04-07 | Stop reason: HOSPADM

## 2021-04-07 RX ORDER — DIPHENHYDRAMINE HYDROCHLORIDE 50 MG/ML
12.5 INJECTION, SOLUTION INTRAMUSCULAR; INTRAVENOUS AS NEEDED
Status: DISCONTINUED | OUTPATIENT
Start: 2021-04-07 | End: 2021-04-07 | Stop reason: HOSPADM

## 2021-04-07 RX ORDER — DEXMEDETOMIDINE HYDROCHLORIDE 100 UG/ML
INJECTION, SOLUTION INTRAVENOUS AS NEEDED
Status: DISCONTINUED | OUTPATIENT
Start: 2021-04-07 | End: 2021-04-07 | Stop reason: HOSPADM

## 2021-04-07 RX ORDER — ASPIRIN 325 MG
325 TABLET ORAL DAILY
Qty: 90 TAB | Refills: 0 | Status: SHIPPED | OUTPATIENT
Start: 2021-04-07

## 2021-04-07 RX ORDER — SODIUM CHLORIDE, SODIUM LACTATE, POTASSIUM CHLORIDE, CALCIUM CHLORIDE 600; 310; 30; 20 MG/100ML; MG/100ML; MG/100ML; MG/100ML
INJECTION, SOLUTION INTRAVENOUS
Status: DISCONTINUED | OUTPATIENT
Start: 2021-04-07 | End: 2021-04-07 | Stop reason: HOSPADM

## 2021-04-07 RX ORDER — MORPHINE SULFATE 2 MG/ML
2 INJECTION, SOLUTION INTRAMUSCULAR; INTRAVENOUS
Status: DISCONTINUED | OUTPATIENT
Start: 2021-04-07 | End: 2021-04-07 | Stop reason: HOSPADM

## 2021-04-07 RX ORDER — FENTANYL CITRATE 50 UG/ML
25 INJECTION, SOLUTION INTRAMUSCULAR; INTRAVENOUS
Status: DISCONTINUED | OUTPATIENT
Start: 2021-04-07 | End: 2021-04-07 | Stop reason: HOSPADM

## 2021-04-07 RX ORDER — MIDAZOLAM HYDROCHLORIDE 1 MG/ML
0.5 INJECTION, SOLUTION INTRAMUSCULAR; INTRAVENOUS
Status: DISCONTINUED | OUTPATIENT
Start: 2021-04-07 | End: 2021-04-07 | Stop reason: HOSPADM

## 2021-04-07 RX ORDER — PHENYLEPHRINE HCL IN 0.9% NACL 0.4MG/10ML
SYRINGE (ML) INTRAVENOUS AS NEEDED
Status: DISCONTINUED | OUTPATIENT
Start: 2021-04-07 | End: 2021-04-07 | Stop reason: HOSPADM

## 2021-04-07 RX ORDER — LIDOCAINE HYDROCHLORIDE 20 MG/ML
INJECTION, SOLUTION EPIDURAL; INFILTRATION; INTRACAUDAL; PERINEURAL AS NEEDED
Status: DISCONTINUED | OUTPATIENT
Start: 2021-04-07 | End: 2021-04-07 | Stop reason: HOSPADM

## 2021-04-07 RX ORDER — LIDOCAINE HYDROCHLORIDE 10 MG/ML
0.1 INJECTION, SOLUTION EPIDURAL; INFILTRATION; INTRACAUDAL; PERINEURAL AS NEEDED
Status: DISCONTINUED | OUTPATIENT
Start: 2021-04-07 | End: 2021-04-07 | Stop reason: HOSPADM

## 2021-04-07 RX ORDER — HYDROCODONE BITARTRATE AND ACETAMINOPHEN 5; 325 MG/1; MG/1
1 TABLET ORAL
Qty: 14 TAB | Refills: 0 | Status: SHIPPED | OUTPATIENT
Start: 2021-04-07 | End: 2021-04-10

## 2021-04-07 RX ADMIN — DEXMEDETOMIDINE HYDROCHLORIDE 4 MCG: 100 INJECTION, SOLUTION, CONCENTRATE INTRAVENOUS at 13:09

## 2021-04-07 RX ADMIN — Medication 40 MCG: at 13:44

## 2021-04-07 RX ADMIN — LIDOCAINE HYDROCHLORIDE 30 MG: 20 INJECTION, SOLUTION EPIDURAL; INFILTRATION; INTRACAUDAL; PERINEURAL at 13:05

## 2021-04-07 RX ADMIN — DEXMEDETOMIDINE HYDROCHLORIDE 4 MCG: 100 INJECTION, SOLUTION, CONCENTRATE INTRAVENOUS at 13:15

## 2021-04-07 RX ADMIN — MIDAZOLAM HYDROCHLORIDE 3 MG: 1 INJECTION, SOLUTION INTRAMUSCULAR; INTRAVENOUS at 12:17

## 2021-04-07 RX ADMIN — DEXMEDETOMIDINE HYDROCHLORIDE 4 MCG: 100 INJECTION, SOLUTION, CONCENTRATE INTRAVENOUS at 13:18

## 2021-04-07 RX ADMIN — DEXMEDETOMIDINE HYDROCHLORIDE 4 MCG: 100 INJECTION, SOLUTION, CONCENTRATE INTRAVENOUS at 13:11

## 2021-04-07 RX ADMIN — DEXMEDETOMIDINE HYDROCHLORIDE 4 MCG: 100 INJECTION, SOLUTION, CONCENTRATE INTRAVENOUS at 13:13

## 2021-04-07 RX ADMIN — PROPOFOL 75 MCG/KG/MIN: 10 INJECTION, EMULSION INTRAVENOUS at 13:05

## 2021-04-07 RX ADMIN — Medication 40 MCG: at 13:37

## 2021-04-07 RX ADMIN — SODIUM CHLORIDE, POTASSIUM CHLORIDE, SODIUM LACTATE AND CALCIUM CHLORIDE: 600; 310; 30; 20 INJECTION, SOLUTION INTRAVENOUS at 13:04

## 2021-04-07 RX ADMIN — CEFAZOLIN 3 G: 1 INJECTION, POWDER, FOR SOLUTION INTRAMUSCULAR; INTRAVENOUS; PARENTERAL at 13:07

## 2021-04-07 RX ADMIN — ACETAMINOPHEN 650 MG: 325 TABLET ORAL at 12:06

## 2021-04-07 RX ADMIN — SODIUM CHLORIDE, POTASSIUM CHLORIDE, SODIUM LACTATE AND CALCIUM CHLORIDE 125 ML/HR: 600; 310; 30; 20 INJECTION, SOLUTION INTRAVENOUS at 12:07

## 2021-04-07 RX ADMIN — FENTANYL CITRATE 25 MCG: 50 INJECTION, SOLUTION INTRAMUSCULAR; INTRAVENOUS at 12:17

## 2021-04-07 NOTE — PERIOP NOTES
Discharge instructions and medications reviewed with patient and wife - Mikayla Barker. Opportunity was given for patient and responsible party to ask questions. No further questions at this time. Responsible party and patient verbalizes understanding of discharge instructions. A Copy of discharge instructions given to patient. Patient discharged with all belongings.

## 2021-04-07 NOTE — OP NOTES
[facility]  OPERATIVE REPORT    Name:  Carmenza Crawley  MR#:  [id number]  :  1945  ACCOUNT #:  [de-identified]  DATE OF SERVICE:  2021      PREOPERATIVE DIAGNOSES:  1. Left ankle lateral cyst/mass. 2.  Left ankle pain. POSTOPERATIVE DIAGNOSES:  1. Left ankle lateral cyst/mass. 2.  Left ankle pain. PROCEDURE PERFORMED:  Excision, left ankle lateral cyst/mass. SURGEON:  Duglas Espinoza MD    ASSISTANT:  none    ANESTHESIA:  General.    COMPLICATIONS:  None. SPECIMENS REMOVED:  Cyst/mass. IMPLANTS:  none    ESTIMATED BLOOD LOSS:  Minimal.    TOURNIQUET TIME:  30 minutes, Esmarch tourniquet, left lower extremity. DISPOSITION:  The patient was taken to the recovery room in stable condition. OPERATIVE INDICATIONS:  The patient is a 66-year-old male who I met fairly recently, but has had a large lateral cyst/mass on his left ankle. We ended up getting an MRI, which better showed this. He wished to have it excised. Informed consent was obtained including all risks and benefits and he wished to proceed. OPERATIVE PROCEDURE:  The patient was identified in the preoperative holding area. Left lower extremity was marked to the patient. All preoperative questions were answered. He was seen by the Anesthesia Department. A lower extremity block was performed. He was taken to the operating room and transferred to the operating table in supine position. Once appropriate anesthesia was obtained, the left leg was prepped and draped in the usual sterile fashion. Time-out was conducted indicating correct operative site. The patient received IV Ancef and cephalosporin antibiotics prior to incision being made. Next, an Esmarch tourniquet was placed above the surgical area by appropriately padding the area and then placing the Esmarch tourniquet. He had a large mass or cyst on the lateral portion of the ankle.   A longitudinal incision was made over this and dissected down through the soft tissue to a quite large mass or cyst.  We carefully dissected in all planes around this all the way down and excised the whole mass in one piece in its entirety. It was probably larger than a golf ball, certainly 3.5 x 3.5 cm. At one point, it did appear that there was a small amount of ganglion cyst type fluid which did not want to come out of it. This was placed in the specimen cup and sent to the lab for analysis. We then thoroughly irrigated this whole area, closed this with a Monocryl suture deep and subcuticular layer, nylon sutures in the skin. Sterile dressings were placed. A splint was placed. The patient was awoken and taken to the recovery room in stable condition.         MD LORE [email or web address]  D:  04/07/2021 13:58  T:  04/07/2021 17:35  JOB #:  [id number]

## 2021-04-07 NOTE — DISCHARGE INSTRUCTIONS
Limited weight left leg  Maintain splint until follow up         Dr. Maria M Deluna Postoperative Patient Instructions    1. Please maintain the dressing and/or splint placed at surgery until your follow up appointment. We will remove it at your appointment. Please keep the dressing clean and dry. If you have any questions or problems with your dressing, please call our office at [phone].  2. Please elevate the operative extremity to the level of the heart to keep swelling at a minimum. You may get up to move around, but when seated please keep the extremity elevated as much as possible. This will decrease swelling and postoperative pain. 3. If you were told to be non-weight bearing following surgery, please do not place the foot on the ground. You may use crutches or we can help arrange for a scooter for you to stay off of your operative leg. 4. If you are in a special shoe or boot and told that you may bear weight as tolerated, then you may do so, but please keep the extremity elevated when not moving around. 5. If you had a block from the Anesthesia doctor before your surgery, expect this to wear off around 12-24 hours after you received it and you should start taking your pain medication when the feeling begins to come back into your leg. 6. A prescription for pain medicine is provided. The key to pain control is staying ahead. For the first 48-72 hours after your surgery, you may want to take your pain medication of a regular schedule. After that, you may only need it on an as-needed basis. 7. Please begin taking one Enteric Coated Aspirin 325 mg daily on the morning after your surgery until you are told you do not need to do this anymore. This can lower the risk of developing a blood clot after surgery. If you are not sure if you can take an Aspirin daily, please check with your primary care doctor to verify.   8. Signs and symptoms of infection include: redness, increased pain, increased swelling not relieved by elevation, drainage, fever, or chills. If you develop any of these symptoms, call the office at [phone]. 9. Please follow-up at my office at 12-15 days after surgery or when specified at your pre-operative appointment. Please follow the [address] [place] [address] 10 Discharge Instructions provided to you by Dr. Jerrell Garcia for your medications. DATE OF FOLLOW-UP APPOINTMENT: _____________________________________               [address] [address]    Guicho Velasquez MD  4/7/2021     ______________________________________________________________________    Anesthesia Discharge Instructions    After general anesthesia or intervenous sedation, for 24 hours or while taking prescription Narcotics:  · Limit your activities  · Do not drive or operate hazardous machinery  · If you have not urinated within 8 hours after discharge, please contact your surgeon on call. · Do not make important personal or business decisions  · Do not drink alcoholic beverages    Report the following to your surgeon:  · Excessive pain, swelling, redness or odor of or around the surgical area  · Temperature over 100.5 degrees  · Nausea and vomiting lasting longer than 4 hours or if unable to take medication  · Any signs of decreased circulation or nerve impairment to extremity:  Change in color, persistent numbness, tingling, coldness or increased pain.   · Any questions

## 2021-04-07 NOTE — BRIEF OP NOTE
Brief Postoperative Note    Patient: Jiles Soulier  YOB: 1945  MRN: [id number]    Date of Procedure: 4/7/2021     Pre-Op Diagnosis: LEFT ANKLE GANGLION CYST    Post-Op Diagnosis: Same as preoperative diagnosis.       Procedure(s):  EXCISION LEFT ANKLE LATERAL CYST    Surgeon(s):  Nikita Morel MD    Surgical Assistant: Surg Asst-1: Lucina Dowd Angie    Anesthesia: Other     Estimated Blood Loss (mL): Minimal    Complications: None    Specimens:   ID Type Source Tests Collected by Time Destination   1 : Left ankle mass Fresh Mass  Nikita Morel MD 4/7/2021 1338 Pathology        Implants: * No implants in log *    Drains: * No LDAs found *    Findings: as above    Electronically Signed by Elizabeth Talbot MD on 4/7/2021 at 2:08 PM

## 2021-04-07 NOTE — ANESTHESIA POSTPROCEDURE EVALUATION
Procedure(s):  EXCISION LEFT ANKLE LATERAL CYST.    regional    Anesthesia Post Evaluation      Multimodal analgesia: multimodal analgesia not used between 6 hours prior to anesthesia start to PACU discharge  Patient location during evaluation: PACU  Patient participation: complete - patient participated  Level of consciousness: awake  Pain score: 0  Pain management: adequate  Airway patency: patent  Anesthetic complications: no  Cardiovascular status: acceptable  Respiratory status: acceptable  Hydration status: acceptable  Comments: I have evaluated the patient and meets criteria for discharge from PACU. Shannon Barajas MD    Final Post Anesthesia Temperature Assessment:  Normothermia (36.0-37.5 degrees C)      INITIAL Post-op Vital signs:   Vitals Value Taken Time   /74 04/07/21 1420   Temp 36.7 °C (98.1 °F) 04/07/21 1425   Pulse 52 04/07/21 1430   Resp 17 04/07/21 1430   SpO2 98 % 04/07/21 1430   Vitals shown include unvalidated device data.

## 2021-04-07 NOTE — ANESTHESIA PREPROCEDURE EVALUATION
Relevant Problems   CARDIOVASCULAR   (+) HTN (hypertension)      GASTROINTESTINAL   (+) GERD (gastroesophageal reflux disease)      ENDOCRINE   (+) Diabetes mellitus (HCC)   (+) Morbid obesity (HCC)       Anesthetic History   No history of anesthetic complications            Review of Systems / Medical History  Patient summary reviewed, nursing notes reviewed and pertinent labs reviewed    Pulmonary  Within defined limits      Sleep apnea           Neuro/Psych   Within defined limits           Cardiovascular  Within defined limits  Hypertension      CHF             GI/Hepatic/Renal  Within defined limits   GERD    Renal disease       Endo/Other  Within defined limits  Diabetes    Morbid obesity and arthritis     Other Findings              Physical Exam    Airway  Mallampati: II  TM Distance: > 6 cm  Neck ROM: normal range of motion   Mouth opening: Normal     Cardiovascular  Regular rate and rhythm,  S1 and S2 normal,  no murmur, click, rub, or gallop             Dental  No notable dental hx       Pulmonary  Breath sounds clear to auscultation               Abdominal  GI exam deferred       Other Findings            Anesthetic Plan    ASA: 3  Anesthesia type: regional - popliteal fossa block          Induction: Intravenous  Anesthetic plan and risks discussed with: Patient

## 2021-04-07 NOTE — ROUTINE PROCESS
Patient: Royce Stone MRN: [id number]  SSN: [id number] YOB: 1945  Age: 76 y.o. Sex: male Patient is status post Procedure(s): EXCISION LEFT ANKLE LATERAL CYST. Surgeon(s) and Role: Carolyn Hutchinson MD - Primary Local/Dose/Irrigation:  0.9% normal saline used for irrigation Peripheral IV 04/07/21 Left Hand (Active) Site Assessment Clean, dry, & intact 04/07/21 1156 Phlebitis Assessment 0 04/07/21 1156 Infiltration Assessment 0 04/07/21 1156 Dressing Status New 04/07/21 1156 Dressing Type Tape;Transparent 04/07/21 1156 Hub Color/Line Status Pink; Infusing 04/07/21 1156 Dressing/Packing:  Incision 04/07/21 Ankle Left-Dressing/Treatment: Ace wrap;Cast padding;Gauze dressing/dressing sponge;Splint;Xeroform(closed with sutures) (04/07/21 1341) Splint/Cast:  ] Other:

## 2022-03-07 ENCOUNTER — TRANSCRIBE ORDER (OUTPATIENT)
Dept: SCHEDULING | Age: 77
End: 2022-03-07

## 2022-03-07 DIAGNOSIS — I50.30 DIASTOLIC HEART FAILURE (HCC): Primary | ICD-10-CM

## 2022-03-18 ENCOUNTER — HOSPITAL ENCOUNTER (OUTPATIENT)
Dept: NON INVASIVE DIAGNOSTICS | Age: 77
Discharge: HOME | End: 2022-03-18
Attending: INTERNAL MEDICINE
Payer: MEDICARE

## 2022-03-18 VITALS
SYSTOLIC BLOOD PRESSURE: 150 MMHG | BODY MASS INDEX: 40.32 KG/M2 | HEIGHT: 68 IN | WEIGHT: 266 LBS | DIASTOLIC BLOOD PRESSURE: 83 MMHG

## 2022-03-18 DIAGNOSIS — I50.30 DIASTOLIC HEART FAILURE (HCC): ICD-10-CM

## 2022-03-18 LAB
ECHO AO ROOT DIAM: 3.3 CM
ECHO AO ROOT INDEX: 1.43 CM/M2
ECHO AV AREA PEAK VELOCITY: 2.5 CM2
ECHO AV AREA/BSA PEAK VELOCITY: 1.1 CM2/M2
ECHO AV PEAK GRADIENT: 10 MMHG
ECHO AV PEAK VELOCITY: 1.6 M/S
ECHO AV VELOCITY RATIO: 0.75
ECHO EST RA PRESSURE: 5 MMHG
ECHO LA DIAMETER INDEX: 1.17 CM/M2
ECHO LA DIAMETER: 2.7 CM
ECHO LA TO AORTIC ROOT RATIO: 0.82
ECHO LA VOL 4C: 44 ML (ref 18–58)
ECHO LA VOLUME INDEX A4C: 19 ML/M2 (ref 16–34)
ECHO LV E' LATERAL VELOCITY: 8 CM/S
ECHO LV E' SEPTAL VELOCITY: 5 CM/S
ECHO LV FRACTIONAL SHORTENING: 29 % (ref 28–44)
ECHO LV INTERNAL DIMENSION DIASTOLE INDEX: 1.82 CM/M2
ECHO LV INTERNAL DIMENSION DIASTOLIC: 4.2 CM (ref 4.2–5.9)
ECHO LV INTERNAL DIMENSION SYSTOLIC INDEX: 1.3 CM/M2
ECHO LV INTERNAL DIMENSION SYSTOLIC: 3 CM
ECHO LV IVSD: 1.1 CM (ref 0.6–1)
ECHO LV IVSS: 1.3 CM
ECHO LV MASS 2D: 137.2 G (ref 88–224)
ECHO LV MASS INDEX 2D: 59.4 G/M2 (ref 49–115)
ECHO LV POSTERIOR WALL DIASTOLIC: 0.9 CM (ref 0.6–1)
ECHO LV POSTERIOR WALL SYSTOLIC: 1 CM
ECHO LV RELATIVE WALL THICKNESS RATIO: 0.43
ECHO LVOT AREA: 3.1 CM2
ECHO LVOT DIAM: 2 CM
ECHO LVOT MEAN GRADIENT: 3 MMHG
ECHO LVOT PEAK GRADIENT: 6 MMHG
ECHO LVOT PEAK VELOCITY: 1.2 M/S
ECHO LVOT STROKE VOLUME INDEX: 34.7 ML/M2
ECHO LVOT SV: 80.1 ML
ECHO LVOT VTI: 25.5 CM
ECHO MV A VELOCITY: 1.08 M/S
ECHO MV E DECELERATION TIME (DT): 169.7 MS
ECHO MV E VELOCITY: 0.8 M/S
ECHO MV E/A RATIO: 0.74
ECHO MV E/E' LATERAL: 10
ECHO MV E/E' RATIO (AVERAGED): 13
ECHO MV E/E' SEPTAL: 16
ECHO PV MAX VELOCITY: 0.8 M/S
ECHO PV PEAK GRADIENT: 3 MMHG
ECHO RIGHT VENTRICULAR SYSTOLIC PRESSURE (RVSP): 37 MMHG
ECHO RV FREE WALL PEAK S': 8 CM/S
ECHO RV TAPSE: 2.6 CM (ref 1.5–2)
ECHO TV REGURGITANT MAX VELOCITY: 2.84 M/S
ECHO TV REGURGITANT PEAK GRADIENT: 32 MMHG

## 2022-03-18 PROCEDURE — 93306 TTE W/DOPPLER COMPLETE: CPT

## 2024-01-08 ENCOUNTER — CLINICAL DOCUMENTATION (OUTPATIENT)
Age: 79
End: 2024-01-08

## 2024-02-02 ENCOUNTER — CLINICAL DOCUMENTATION (OUTPATIENT)
Age: 79
End: 2024-02-02

## 2024-02-02 NOTE — PROGRESS NOTES
Dr. Sammy Marte office called to schedule patient no show appointment due to patient being hospitalized they did not want a next available states patient needs a urgent visit advised them to fax records over for review

## 2024-03-15 ENCOUNTER — OFFICE VISIT (OUTPATIENT)
Age: 79
End: 2024-03-15
Payer: MEDICARE

## 2024-03-15 ENCOUNTER — CLINICAL DOCUMENTATION (OUTPATIENT)
Age: 79
End: 2024-03-15

## 2024-03-15 VITALS
WEIGHT: 294 LBS | SYSTOLIC BLOOD PRESSURE: 128 MMHG | BODY MASS INDEX: 44.56 KG/M2 | OXYGEN SATURATION: 98 % | HEART RATE: 74 BPM | RESPIRATION RATE: 18 BRPM | DIASTOLIC BLOOD PRESSURE: 76 MMHG | HEIGHT: 68 IN | TEMPERATURE: 97.6 F

## 2024-03-15 DIAGNOSIS — R60.0 BILATERAL LOWER EXTREMITY EDEMA: ICD-10-CM

## 2024-03-15 DIAGNOSIS — K76.1 CHRONIC PASSIVE HEPATIC CONGESTION: Primary | ICD-10-CM

## 2024-03-15 LAB
ALBUMIN SERPL-MCNC: 2.6 G/DL (ref 3.5–5)
ALBUMIN/GLOB SERPL: 0.7 (ref 1.1–2.2)
ALP SERPL-CCNC: 66 U/L (ref 45–117)
ALT SERPL-CCNC: 17 U/L (ref 12–78)
ANION GAP SERPL CALC-SCNC: 5 MMOL/L (ref 5–15)
AST SERPL-CCNC: 14 U/L (ref 15–37)
BASOPHILS # BLD: 0.1 K/UL (ref 0–0.1)
BASOPHILS NFR BLD: 1 % (ref 0–1)
BILIRUB DIRECT SERPL-MCNC: 0.3 MG/DL (ref 0–0.2)
BILIRUB SERPL-MCNC: 0.9 MG/DL (ref 0.2–1)
BUN SERPL-MCNC: 49 MG/DL (ref 6–20)
BUN/CREAT SERPL: 20 (ref 12–20)
CALCIUM SERPL-MCNC: 8.1 MG/DL (ref 8.5–10.1)
CHLORIDE SERPL-SCNC: 107 MMOL/L (ref 97–108)
CO2 SERPL-SCNC: 24 MMOL/L (ref 21–32)
CREAT SERPL-MCNC: 2.49 MG/DL (ref 0.7–1.3)
DIFFERENTIAL METHOD BLD: ABNORMAL
EOSINOPHIL # BLD: 0.2 K/UL (ref 0–0.4)
EOSINOPHIL NFR BLD: 3 % (ref 0–7)
ERYTHROCYTE [DISTWIDTH] IN BLOOD BY AUTOMATED COUNT: 17.9 % (ref 11.5–14.5)
GLOBULIN SER CALC-MCNC: 3.7 G/DL (ref 2–4)
GLUCOSE SERPL-MCNC: 224 MG/DL (ref 65–100)
HCT VFR BLD AUTO: 28.3 % (ref 36.6–50.3)
HGB BLD-MCNC: 8.7 G/DL (ref 12.1–17)
IMM GRANULOCYTES # BLD AUTO: 0.1 K/UL (ref 0–0.04)
IMM GRANULOCYTES NFR BLD AUTO: 1 % (ref 0–0.5)
INR PPP: 1.1 (ref 0.9–1.1)
LYMPHOCYTES # BLD: 0.5 K/UL (ref 0.8–3.5)
LYMPHOCYTES NFR BLD: 8 % (ref 12–49)
MCH RBC QN AUTO: 28.2 PG (ref 26–34)
MCHC RBC AUTO-ENTMCNC: 30.7 G/DL (ref 30–36.5)
MCV RBC AUTO: 91.6 FL (ref 80–99)
MONOCYTES # BLD: 0.6 K/UL (ref 0–1)
MONOCYTES NFR BLD: 9 % (ref 5–13)
NEUTS SEG # BLD: 4.7 K/UL (ref 1.8–8)
NEUTS SEG NFR BLD: 78 % (ref 32–75)
NRBC # BLD: 0 K/UL (ref 0–0.01)
NRBC BLD-RTO: 0 PER 100 WBC
PLATELET # BLD AUTO: 150 K/UL (ref 150–400)
PMV BLD AUTO: 11.7 FL (ref 8.9–12.9)
POTASSIUM SERPL-SCNC: 4 MMOL/L (ref 3.5–5.1)
PROT SERPL-MCNC: 6.3 G/DL (ref 6.4–8.2)
PROTHROMBIN TIME: 11.2 SEC (ref 9–11.1)
RBC # BLD AUTO: 3.09 M/UL (ref 4.1–5.7)
RBC MORPH BLD: ABNORMAL
SODIUM SERPL-SCNC: 136 MMOL/L (ref 136–145)
WBC # BLD AUTO: 6.2 K/UL (ref 4.1–11.1)

## 2024-03-15 PROCEDURE — 1123F ACP DISCUSS/DSCN MKR DOCD: CPT | Performed by: INTERNAL MEDICINE

## 2024-03-15 PROCEDURE — 4004F PT TOBACCO SCREEN RCVD TLK: CPT | Performed by: INTERNAL MEDICINE

## 2024-03-15 PROCEDURE — 3074F SYST BP LT 130 MM HG: CPT | Performed by: INTERNAL MEDICINE

## 2024-03-15 PROCEDURE — 99204 OFFICE O/P NEW MOD 45 MIN: CPT | Performed by: INTERNAL MEDICINE

## 2024-03-15 PROCEDURE — G8417 CALC BMI ABV UP PARAM F/U: HCPCS | Performed by: INTERNAL MEDICINE

## 2024-03-15 PROCEDURE — G8427 DOCREV CUR MEDS BY ELIG CLIN: HCPCS | Performed by: INTERNAL MEDICINE

## 2024-03-15 PROCEDURE — G8484 FLU IMMUNIZE NO ADMIN: HCPCS | Performed by: INTERNAL MEDICINE

## 2024-03-15 PROCEDURE — 3078F DIAST BP <80 MM HG: CPT | Performed by: INTERNAL MEDICINE

## 2024-03-15 RX ORDER — ALBUTEROL SULFATE 90 UG/1
AEROSOL, METERED RESPIRATORY (INHALATION)
COMMUNITY

## 2024-03-15 RX ORDER — INSULIN GLARGINE 300 U/ML
INJECTION, SOLUTION SUBCUTANEOUS
COMMUNITY
Start: 2024-02-07

## 2024-03-15 RX ORDER — FLUTICASONE FUROATE AND VILANTEROL TRIFENATATE 200; 25 UG/1; UG/1
1 POWDER RESPIRATORY (INHALATION) DAILY
COMMUNITY
Start: 2023-12-21 | End: 2024-04-04

## 2024-03-15 RX ORDER — GABAPENTIN 300 MG/1
2 CAPSULE ORAL NIGHTLY
COMMUNITY
Start: 2022-07-28

## 2024-03-15 RX ORDER — LACTULOSE 10 G/15ML
SOLUTION ORAL 2 TIMES DAILY
COMMUNITY
Start: 2024-01-08

## 2024-03-15 RX ORDER — UMECLIDINIUM 62.5 UG/1
1 AEROSOL, POWDER ORAL DAILY PRN
COMMUNITY
Start: 2022-08-09

## 2024-03-15 RX ORDER — GLIPIZIDE 10 MG/1
10 TABLET, FILM COATED, EXTENDED RELEASE ORAL 2 TIMES DAILY
COMMUNITY
Start: 2024-02-07

## 2024-03-15 RX ORDER — CEPHALEXIN 500 MG/1
CAPSULE ORAL
COMMUNITY
Start: 2023-12-15 | End: 2024-04-04

## 2024-03-15 ASSESSMENT — PATIENT HEALTH QUESTIONNAIRE - PHQ9
1. LITTLE INTEREST OR PLEASURE IN DOING THINGS: NOT AT ALL
SUM OF ALL RESPONSES TO PHQ9 QUESTIONS 1 & 2: 0
SUM OF ALL RESPONSES TO PHQ QUESTIONS 1-9: 0
2. FEELING DOWN, DEPRESSED OR HOPELESS: NOT AT ALL
SUM OF ALL RESPONSES TO PHQ QUESTIONS 1-9: 0

## 2024-03-15 NOTE — PROGRESS NOTES
Identified pt with two pt identifiers(name and ). Reviewed record in preparation for visit and have obtained necessary documentation.  Patient is experiencing SOB and pressure. Currently having paracenteses once weekly at [facility] since 2023     Health Maintenance Review: Patient reminded of \"due or due soon\" health maintenance. I have asked the patient to contact his/her primary care provider (PCP) for follow-up on his/her health maintenance.    Coordination of Care Questionnaire:  :   1) Have you been to an emergency room, urgent care, or hospitalized since your last visit?  If yes, where when, and reason for visit? no       2. Have seen or consulted any other health care provider since your last visit?   If yes, where when, and reason for visit?  No      Patient is accompanied by wife and sister in law. I have received verbal consent from Tia Monterroso to discuss any/all medical information while they are present in the room.

## 2024-03-15 NOTE — PROGRESS NOTES
Patient left and did not check out. Left message to call to schedule f/u in 4 weeks. Per Shila, schedule 4/19@12 noon. Patient also has lab work and imaging orders to be mailed.

## 2024-03-15 NOTE — PROGRESS NOTES
[facility]      Ned Patel MD, FACP, FACG, FAASLD      URI Chang-SHAE Byers, [facility]   Dena Morganethanzhao, [facility]   Stacey Mark, [facility]  Maurice Mckeon, [facility]   Falguni Brink, [facility]   Trina Justino, [facility] [place]   [address], [address]   [place]  [address] [phone]   FAX: [phone]  [facility]   [address], [address]   [place]  [address] [phone]   FAX: [phone]       Patient Care Team:  Sammy Marte as PCP - General (Cardiology)  Richard Blas MD (Nephrology)  Tonny Cooper MD (Endocrinology)  Florentin Medrano MD as Consulting Physician (Cardiothoracic Surgery)  Zina Mandel MD as Consulting Physician (Pulmonary Disease)      Patient Active Problem List   Diagnosis    GERD (gastroesophageal reflux disease)    Carbuncle and furuncle of neck    Type 2 diabetes mellitus (HCC)    Hypercholesteremia    Morbid obesity (HCC)    DJD (degenerative joint disease)    HTN (hypertension)    CHF (congestive heart failure) (HCC)    Ascites    Bilateral lower extremity edema       The clinicians listed above have asked me to see Tai Monterroso in consultation regarding management of   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      All medical records sent by the referring physicians were reviewed including imaging studies     The patient is a 78 y.o. male who was found to have chronic liver disease and cirrhosis in 12/2023 when he developed swelling of the abdomen and lower extremities.    The patient also has diastolic CHF and CKD with baseline SC of 2.5 mg.    An ECHO in 3/2022 was LVEF 60-65%, normal RV, no TR.    An assessment of liver fibrosis with biopsy or elastography has not been

## 2024-03-28 ENCOUNTER — TELEPHONE (OUTPATIENT)
Age: 79
End: 2024-03-28

## 2024-03-28 NOTE — TELEPHONE ENCOUNTER
3/28/24@1029 Patient wife calling to make our office aware patient remains at [facility]. Next appt in our office 4/19/24. (KF)

## 2024-04-04 PROBLEM — R60.0 BILATERAL LOWER EXTREMITY EDEMA: Status: ACTIVE | Noted: 2024-04-04

## 2024-04-04 PROBLEM — I50.9 CHF (CONGESTIVE HEART FAILURE) (HCC): Status: ACTIVE | Noted: 2024-04-04

## 2024-04-04 PROBLEM — R18.8 ASCITES: Status: ACTIVE | Noted: 2024-04-04

## (undated) DEVICE — DRESSING,GAUZE,XEROFORM,CURAD,1"X8",ST: Brand: CURAD

## (undated) DEVICE — SPONGE GZ W4XL4IN COT RADPQ HIGHLY ABSRB

## (undated) DEVICE — SUTURE MCRYL SZ 3-0 L27IN ABSRB UD L19MM PS-2 3/8 CIR PRIM Y427H

## (undated) DEVICE — REM POLYHESIVE ADULT PATIENT RETURN ELECTRODE: Brand: VALLEYLAB

## (undated) DEVICE — STRAP,POSITIONING,KNEE/BODY,FOAM,4X60": Brand: MEDLINE

## (undated) DEVICE — AIR SHEET,LAT,COMFORT GLIDE, BLEND 40X80: Brand: MEDLINE

## (undated) DEVICE — SPONGE GZ W4XL4IN COT 12 PLY TYP VII WVN C FLD DSGN

## (undated) DEVICE — SOL IRR SOD CL 0.9% 1000ML BTL --

## (undated) DEVICE — STERILE POLYISOPRENE POWDER-FREE SURGICAL GLOVES: Brand: PROTEXIS

## (undated) DEVICE — SYRINGE IRRIG 60ML SFT PLIABLE BLB EZ TO GRP 1 HND USE W/

## (undated) DEVICE — DRAPE,EXTREMITY,89X128,STERILE: Brand: MEDLINE

## (undated) DEVICE — DRAPE,REIN 53X77,STERILE: Brand: MEDLINE

## (undated) DEVICE — STERILE POLYISOPRENE POWDER-FREE SURGICAL GLOVES WITH EMOLLIENT COATING: Brand: PROTEXIS

## (undated) DEVICE — TUBING SUCT 12FR MAL ALUM SHFT FN CAP VENT UNIV CONN W/ OBT

## (undated) DEVICE — SOLUTION SURG PREP 26 CC PURPREP

## (undated) DEVICE — TUBING, SUCTION, 1/4" X 12', STRAIGHT: Brand: MEDLINE

## (undated) DEVICE — BNDG ELAS HK LOOP 4X5YD NS -- MATRIX

## (undated) DEVICE — BANDAGE,ELASTIC,ESMARK,STERILE,4"X9',LF: Brand: MEDLINE

## (undated) DEVICE — INFECTION CONTROL KIT SYS

## (undated) DEVICE — SUT ETHLN 3-0 18IN PS2 BLK --